# Patient Record
Sex: MALE | Race: WHITE | NOT HISPANIC OR LATINO
[De-identification: names, ages, dates, MRNs, and addresses within clinical notes are randomized per-mention and may not be internally consistent; named-entity substitution may affect disease eponyms.]

---

## 2021-12-03 ENCOUNTER — APPOINTMENT (OUTPATIENT)
Dept: UROLOGY | Facility: CLINIC | Age: 46
End: 2021-12-03
Payer: COMMERCIAL

## 2021-12-03 VITALS
SYSTOLIC BLOOD PRESSURE: 134 MMHG | DIASTOLIC BLOOD PRESSURE: 79 MMHG | WEIGHT: 150 LBS | HEIGHT: 68 IN | BODY MASS INDEX: 22.73 KG/M2 | HEART RATE: 86 BPM | TEMPERATURE: 98.3 F

## 2021-12-03 DIAGNOSIS — Z78.9 OTHER SPECIFIED HEALTH STATUS: ICD-10-CM

## 2021-12-03 DIAGNOSIS — Z87.891 PERSONAL HISTORY OF NICOTINE DEPENDENCE: ICD-10-CM

## 2021-12-03 PROBLEM — Z00.00 ENCOUNTER FOR PREVENTIVE HEALTH EXAMINATION: Status: ACTIVE | Noted: 2021-12-03

## 2021-12-03 PROCEDURE — 99203 OFFICE O/P NEW LOW 30 MIN: CPT

## 2021-12-03 RX ORDER — VALACYCLOVIR 500 MG/1
500 TABLET, FILM COATED ORAL TWICE DAILY
Qty: 28 | Refills: 0 | Status: ACTIVE | COMMUNITY
Start: 2021-12-03 | End: 1900-01-01

## 2021-12-03 NOTE — ASSESSMENT
[FreeTextEntry1] : 47yo male with 1 month history of penile lesion\par 2-3mm eschar on base of penis\par Differential includes trauma vs herpetic lesion \par Recommend herpes antibiody testing\par Empiric treatment with valtrex x2 weeks\par He will abstain from sexual activity until this resolves \par F/u prn

## 2021-12-03 NOTE — HISTORY OF PRESENT ILLNESS
[FreeTextEntry1] : Healthy 45yo male here for evaluation of penile lesion. First noticed small pimple about 1 month ago. Was treated with an antibiotic by his PCP and it went away. Now with 2-3mm scab on dorsal surface near base of penis. Tender but not painful. He is sexually active with 1 partner for several months, does not wear protection. No prior STI history.  [None] : no symptoms

## 2021-12-03 NOTE — PHYSICAL EXAM
[General Appearance - Well Developed] : well developed [General Appearance - Well Nourished] : well nourished [Normal Appearance] : normal appearance [Well Groomed] : well groomed [General Appearance - In No Acute Distress] : no acute distress [FreeTextEntry1] : circumcised male, 2-3mm eschar on dorsal surface of penis near base of shaft, tender, mild surrounding erythema

## 2021-12-03 NOTE — REVIEW OF SYSTEMS
[Dysuria] : no dysuria [Genital Lesion] : genital lesion [Testicular Pain] : no testicular pain [Negative] : Constitutional

## 2021-12-06 ENCOUNTER — NON-APPOINTMENT (OUTPATIENT)
Age: 46
End: 2021-12-06

## 2021-12-06 LAB
HSV 1+2 IGG SER IA-IMP: POSITIVE
HSV 1+2 IGG SER IA-IMP: POSITIVE
HSV1 IGG SER QL: 3.7 INDEX
HSV2 IGG SER QL: 1.58 INDEX

## 2021-12-17 ENCOUNTER — NON-APPOINTMENT (OUTPATIENT)
Age: 46
End: 2021-12-17

## 2021-12-17 ENCOUNTER — APPOINTMENT (OUTPATIENT)
Dept: UROLOGY | Facility: CLINIC | Age: 46
End: 2021-12-17
Payer: COMMERCIAL

## 2021-12-17 VITALS
TEMPERATURE: 98.3 F | WEIGHT: 150 LBS | BODY MASS INDEX: 22.73 KG/M2 | DIASTOLIC BLOOD PRESSURE: 83 MMHG | HEIGHT: 68 IN | HEART RATE: 88 BPM | SYSTOLIC BLOOD PRESSURE: 131 MMHG

## 2021-12-17 DIAGNOSIS — N48.9 DISORDER OF PENIS, UNSPECIFIED: ICD-10-CM

## 2021-12-17 PROCEDURE — 99213 OFFICE O/P EST LOW 20 MIN: CPT

## 2021-12-17 NOTE — REVIEW OF SYSTEMS
[Genital Lesion] : genital lesion [Negative] : Constitutional [Dysuria] : no dysuria [Testicular Pain] : no testicular pain

## 2021-12-17 NOTE — HISTORY OF PRESENT ILLNESS
[None] : no symptoms [FreeTextEntry1] : Healthy 45yo male here for evaluation of penile lesion. First noticed small pimple about 1 month ago. Was treated with an antibiotic by his PCP and it went away. Now with 2-3mm scab on dorsal surface near base of penis. Tender but not painful. He is sexually active with 1 partner for several months, does not wear protection. No prior STI history. \par \par 12/17/21 here for f/u. Tested positive for Herpes antibodies, completed Valtrex and lesion has resolved. Here to discuss options moving forward.

## 2021-12-17 NOTE — PHYSICAL EXAM
[General Appearance - Well Developed] : well developed [General Appearance - Well Nourished] : well nourished [Normal Appearance] : normal appearance [Well Groomed] : well groomed [General Appearance - In No Acute Distress] : no acute distress [FreeTextEntry1] : circumcised male, previous 2-3mm eschar on dorsal surface of penis resolved

## 2021-12-17 NOTE — ASSESSMENT
[FreeTextEntry1] : 45yo male with 1 month history of penile lesion\par Tested positive for Herpes antibodies\par Lesion resolved with Valtrex\par Discussed that this can recur\par Discussed signs/symptoms to return\par F/u prn

## 2024-09-22 ENCOUNTER — NON-APPOINTMENT (OUTPATIENT)
Age: 49
End: 2024-09-22